# Patient Record
Sex: FEMALE | Race: WHITE | URBAN - METROPOLITAN AREA
[De-identification: names, ages, dates, MRNs, and addresses within clinical notes are randomized per-mention and may not be internally consistent; named-entity substitution may affect disease eponyms.]

---

## 2019-06-03 ENCOUNTER — APPOINTMENT (RX ONLY)
Dept: URBAN - METROPOLITAN AREA CLINIC 45 | Facility: CLINIC | Age: 59
Setting detail: DERMATOLOGY
End: 2019-06-03

## 2019-06-03 DIAGNOSIS — L81.4 OTHER MELANIN HYPERPIGMENTATION: ICD-10-CM

## 2019-06-03 DIAGNOSIS — D18.0 HEMANGIOMA: ICD-10-CM

## 2019-06-03 DIAGNOSIS — L82.1 OTHER SEBORRHEIC KERATOSIS: ICD-10-CM

## 2019-06-03 DIAGNOSIS — D22 MELANOCYTIC NEVI: ICD-10-CM

## 2019-06-03 PROBLEM — L23.7 ALLERGIC CONTACT DERMATITIS DUE TO PLANTS, EXCEPT FOOD: Status: ACTIVE | Noted: 2019-06-03

## 2019-06-03 PROBLEM — F41.9 ANXIETY DISORDER, UNSPECIFIED: Status: ACTIVE | Noted: 2019-06-03

## 2019-06-03 PROBLEM — M12.9 ARTHROPATHY, UNSPECIFIED: Status: ACTIVE | Noted: 2019-06-03

## 2019-06-03 PROBLEM — F32.9 MAJOR DEPRESSIVE DISORDER, SINGLE EPISODE, UNSPECIFIED: Status: ACTIVE | Noted: 2019-06-03

## 2019-06-03 PROBLEM — H91.90 UNSPECIFIED HEARING LOSS, UNSPECIFIED EAR: Status: ACTIVE | Noted: 2019-06-03

## 2019-06-03 PROBLEM — J30.1 ALLERGIC RHINITIS DUE TO POLLEN: Status: ACTIVE | Noted: 2019-06-03

## 2019-06-03 PROBLEM — K21.9 GASTRO-ESOPHAGEAL REFLUX DISEASE WITHOUT ESOPHAGITIS: Status: ACTIVE | Noted: 2019-06-03

## 2019-06-03 PROBLEM — L85.3 XEROSIS CUTIS: Status: ACTIVE | Noted: 2019-06-03

## 2019-06-03 PROBLEM — D56.9 THALASSEMIA, UNSPECIFIED: Status: ACTIVE | Noted: 2019-06-03

## 2019-06-03 PROBLEM — D18.01 HEMANGIOMA OF SKIN AND SUBCUTANEOUS TISSUE: Status: ACTIVE | Noted: 2019-06-03

## 2019-06-03 PROBLEM — L29.8 OTHER PRURITUS: Status: ACTIVE | Noted: 2019-06-03

## 2019-06-03 PROBLEM — D22.5 MELANOCYTIC NEVI OF TRUNK: Status: ACTIVE | Noted: 2019-06-03

## 2019-06-03 PROCEDURE — ? INVENTORY

## 2019-06-03 PROCEDURE — ? COUNSELING

## 2019-06-03 PROCEDURE — 99202 OFFICE O/P NEW SF 15 MIN: CPT

## 2019-06-03 PROCEDURE — ? SUNSCREEN RECOMMENDATIONS

## 2019-06-03 ASSESSMENT — LOCATION DETAILED DESCRIPTION DERM
LOCATION DETAILED: PERIUMBILICAL SKIN
LOCATION DETAILED: RIGHT LATERAL ABDOMEN
LOCATION DETAILED: LEFT LATERAL ABDOMEN
LOCATION DETAILED: EPIGASTRIC SKIN

## 2019-06-03 ASSESSMENT — LOCATION ZONE DERM: LOCATION ZONE: TRUNK

## 2019-06-03 ASSESSMENT — LOCATION SIMPLE DESCRIPTION DERM: LOCATION SIMPLE: ABDOMEN

## 2020-06-08 ENCOUNTER — APPOINTMENT (RX ONLY)
Dept: URBAN - METROPOLITAN AREA CLINIC 45 | Facility: CLINIC | Age: 60
Setting detail: DERMATOLOGY
End: 2020-06-08

## 2020-06-08 DIAGNOSIS — D18.0 HEMANGIOMA: ICD-10-CM

## 2020-06-08 DIAGNOSIS — D22 MELANOCYTIC NEVI: ICD-10-CM

## 2020-06-08 DIAGNOSIS — L81.4 OTHER MELANIN HYPERPIGMENTATION: ICD-10-CM

## 2020-06-08 DIAGNOSIS — L82.1 OTHER SEBORRHEIC KERATOSIS: ICD-10-CM

## 2020-06-08 PROBLEM — D22.5 MELANOCYTIC NEVI OF TRUNK: Status: ACTIVE | Noted: 2020-06-08

## 2020-06-08 PROBLEM — D18.01 HEMANGIOMA OF SKIN AND SUBCUTANEOUS TISSUE: Status: ACTIVE | Noted: 2020-06-08

## 2020-06-08 PROCEDURE — 99213 OFFICE O/P EST LOW 20 MIN: CPT

## 2020-06-08 PROCEDURE — ? SUNSCREEN RECOMMENDATIONS

## 2020-06-08 PROCEDURE — ? COUNSELING

## 2020-06-08 PROCEDURE — ? FULL BODY SKIN EXAM

## 2020-06-08 ASSESSMENT — LOCATION DETAILED DESCRIPTION DERM
LOCATION DETAILED: LEFT LATERAL ABDOMEN
LOCATION DETAILED: EPIGASTRIC SKIN
LOCATION DETAILED: PERIUMBILICAL SKIN
LOCATION DETAILED: RIGHT LATERAL ABDOMEN

## 2020-06-08 ASSESSMENT — LOCATION SIMPLE DESCRIPTION DERM: LOCATION SIMPLE: ABDOMEN

## 2020-06-08 ASSESSMENT — LOCATION ZONE DERM: LOCATION ZONE: TRUNK

## 2020-06-08 NOTE — PROCEDURE: MIPS QUALITY
Quality 431: Preventive Care And Screening: Unhealthy Alcohol Use - Screening: Patient screened for unhealthy alcohol use using a single question and scores less than 2 times per year
Quality 110: Preventive Care And Screening: Influenza Immunization: Influenza Immunization Administered during Influenza season
Quality 226: Preventive Care And Screening: Tobacco Use: Screening And Cessation Intervention: Patient screened for tobacco use, is a smoker AND received Cessation Counseling
Quality 111:Pneumonia Vaccination Status For Older Adults: Pneumococcal Vaccination Previously Received
Quality 130: Documentation Of Current Medications In The Medical Record: Current Medications Documented
Detail Level: Detailed

## 2021-06-07 ENCOUNTER — APPOINTMENT (RX ONLY)
Dept: URBAN - METROPOLITAN AREA CLINIC 45 | Facility: CLINIC | Age: 61
Setting detail: DERMATOLOGY
End: 2021-06-07

## 2021-06-07 DIAGNOSIS — L82.1 OTHER SEBORRHEIC KERATOSIS: ICD-10-CM | Status: STABLE

## 2021-06-07 DIAGNOSIS — L81.4 OTHER MELANIN HYPERPIGMENTATION: ICD-10-CM | Status: STABLE

## 2021-06-07 DIAGNOSIS — D18.0 HEMANGIOMA: ICD-10-CM | Status: STABLE

## 2021-06-07 DIAGNOSIS — D22 MELANOCYTIC NEVI: ICD-10-CM | Status: STABLE

## 2021-06-07 PROBLEM — D22.5 MELANOCYTIC NEVI OF TRUNK: Status: ACTIVE | Noted: 2021-06-07

## 2021-06-07 PROBLEM — D18.01 HEMANGIOMA OF SKIN AND SUBCUTANEOUS TISSUE: Status: ACTIVE | Noted: 2021-06-07

## 2021-06-07 PROCEDURE — ? COUNSELING

## 2021-06-07 PROCEDURE — 99213 OFFICE O/P EST LOW 20 MIN: CPT

## 2021-06-07 PROCEDURE — ? SUNSCREEN RECOMMENDATIONS

## 2021-06-07 PROCEDURE — ? ADDITIONAL NOTES

## 2021-06-07 ASSESSMENT — LOCATION DETAILED DESCRIPTION DERM
LOCATION DETAILED: LEFT LATERAL ABDOMEN
LOCATION DETAILED: EPIGASTRIC SKIN
LOCATION DETAILED: RIGHT LATERAL ABDOMEN
LOCATION DETAILED: PERIUMBILICAL SKIN

## 2021-06-07 ASSESSMENT — LOCATION SIMPLE DESCRIPTION DERM: LOCATION SIMPLE: ABDOMEN

## 2021-06-07 ASSESSMENT — LOCATION ZONE DERM: LOCATION ZONE: TRUNK

## 2021-10-08 ENCOUNTER — TELEPHONE ENCOUNTER (OUTPATIENT)
Dept: URBAN - METROPOLITAN AREA CLINIC 96 | Facility: CLINIC | Age: 61
End: 2021-10-08

## 2021-10-08 ENCOUNTER — OUT OF OFFICE VISIT (OUTPATIENT)
Dept: URBAN - METROPOLITAN AREA MEDICAL CENTER 24 | Facility: MEDICAL CENTER | Age: 61
End: 2021-10-08
Payer: MEDICARE

## 2021-10-08 DIAGNOSIS — K62.5 ANAL BLEEDING: ICD-10-CM

## 2021-10-08 PROCEDURE — G8427 DOCREV CUR MEDS BY ELIG CLIN: HCPCS | Performed by: INTERNAL MEDICINE

## 2021-10-08 PROCEDURE — 99222 1ST HOSP IP/OBS MODERATE 55: CPT | Performed by: INTERNAL MEDICINE

## 2021-10-09 ENCOUNTER — OUT OF OFFICE VISIT (OUTPATIENT)
Dept: URBAN - METROPOLITAN AREA MEDICAL CENTER 24 | Facility: MEDICAL CENTER | Age: 61
End: 2021-10-09
Payer: MEDICARE

## 2021-10-09 DIAGNOSIS — K92.1 ACUTE MELENA: ICD-10-CM

## 2021-10-09 DIAGNOSIS — K64.8 BLEEDING INTERNAL HEMORRHOIDS: ICD-10-CM

## 2021-10-09 PROCEDURE — 99232 SBSQ HOSP IP/OBS MODERATE 35: CPT | Performed by: INTERNAL MEDICINE

## 2021-10-14 ENCOUNTER — OFFICE VISIT (OUTPATIENT)
Dept: URBAN - METROPOLITAN AREA CLINIC 82 | Facility: CLINIC | Age: 61
End: 2021-10-14
Payer: MEDICARE

## 2021-10-14 ENCOUNTER — WEB ENCOUNTER (OUTPATIENT)
Dept: URBAN - METROPOLITAN AREA CLINIC 82 | Facility: CLINIC | Age: 61
End: 2021-10-14

## 2021-10-14 VITALS
SYSTOLIC BLOOD PRESSURE: 119 MMHG | WEIGHT: 140.6 LBS | TEMPERATURE: 97.1 F | HEART RATE: 89 BPM | HEIGHT: 61 IN | BODY MASS INDEX: 26.55 KG/M2 | DIASTOLIC BLOOD PRESSURE: 67 MMHG

## 2021-10-14 DIAGNOSIS — K57.90 DIVERTICULOSIS: ICD-10-CM

## 2021-10-14 DIAGNOSIS — K64.9 HEMORRHOID: ICD-10-CM

## 2021-10-14 DIAGNOSIS — K62.5 RECTAL BLEED: ICD-10-CM

## 2021-10-14 PROCEDURE — 99213 OFFICE O/P EST LOW 20 MIN: CPT | Performed by: INTERNAL MEDICINE

## 2021-10-14 NOTE — HPI-TODAY'S VISIT:
RECTAL BLEEDING, PAST MONDAY CAME HOME. STARTED ON OCT 11TH, STAYED 3 DAYS.   IT WAS HEMORRHOID, INTERNAL ACCORDING TO HOSPITAL.   NO STOMACH PAIN  NO BLOOD TRANSFUSION, BLOOD WORK WAS GOOD.   DISCHARGE SUMMARY REVIEWED.  H/O DIARRHEA 2 WEEKS BACK,   TRING TO INCREASE FIBER

## 2021-10-15 LAB
BASO (ABSOLUTE): 0
BASOS: 0
EOS (ABSOLUTE): 0.2
EOS: 2
HEMATOCRIT: 27.2
HEMATOLOGY COMMENTS:: (no result)
HEMOGLOBIN: 8.5
IMMATURE CELLS: (no result)
IMMATURE GRANS (ABS): 0
IMMATURE GRANULOCYTES: 0
LYMPHS (ABSOLUTE): 2.2
LYMPHS: 21
MCH: 30.1
MCHC: 31.3
MCV: 97
MONOCYTES(ABSOLUTE): 0.7
MONOCYTES: 7
NEUTROPHILS (ABSOLUTE): 7.4
NEUTROPHILS: 70
NRBC: (no result)
PLATELETS: 427
RBC: 2.82
RDW: 14.2
WBC: 10.5

## 2021-11-10 ENCOUNTER — LAB OUTSIDE AN ENCOUNTER (OUTPATIENT)
Dept: URBAN - METROPOLITAN AREA CLINIC 82 | Facility: CLINIC | Age: 61
End: 2021-11-10

## 2021-11-11 LAB
BASO (ABSOLUTE): 0.1
BASOS: 1
EOS (ABSOLUTE): 0.2
EOS: 2
HEMATOCRIT: 34
HEMATOLOGY COMMENTS:: (no result)
HEMOGLOBIN: 10.9
IMMATURE CELLS: (no result)
IMMATURE GRANS (ABS): 0
IMMATURE GRANULOCYTES: 0
LYMPHS (ABSOLUTE): 2.1
LYMPHS: 24
MCH: 30.4
MCHC: 32.1
MCV: 95
MONOCYTES(ABSOLUTE): 0.7
MONOCYTES: 8
NEUTROPHILS (ABSOLUTE): 5.5
NEUTROPHILS: 65
NRBC: (no result)
PLATELETS: 507
RBC: 3.58
RDW: 14.8
WBC: 8.5

## 2022-01-04 ENCOUNTER — OFFICE VISIT (OUTPATIENT)
Dept: URBAN - METROPOLITAN AREA CLINIC 82 | Facility: CLINIC | Age: 62
End: 2022-01-04
Payer: MEDICARE

## 2022-01-04 VITALS
WEIGHT: 137 LBS | DIASTOLIC BLOOD PRESSURE: 81 MMHG | HEART RATE: 74 BPM | HEIGHT: 61 IN | BODY MASS INDEX: 25.86 KG/M2 | SYSTOLIC BLOOD PRESSURE: 152 MMHG | RESPIRATION RATE: 14 BRPM | TEMPERATURE: 97.1 F

## 2022-01-04 DIAGNOSIS — K44.9 HIATAL HERNIA: ICD-10-CM

## 2022-01-04 DIAGNOSIS — K62.5 RECTAL BLEED: ICD-10-CM

## 2022-01-04 DIAGNOSIS — D64.9 ANEMIA, UNSPECIFIED TYPE: ICD-10-CM

## 2022-01-04 DIAGNOSIS — K64.9 HEMORRHOID: ICD-10-CM

## 2022-01-04 DIAGNOSIS — K57.90 DIVERTICULOSIS: ICD-10-CM

## 2022-01-04 PROCEDURE — 99213 OFFICE O/P EST LOW 20 MIN: CPT | Performed by: INTERNAL MEDICINE

## 2022-01-04 NOTE — HPI-TODAY'S VISIT:
TAKES IRON PILL DAILY. NO STOACH ISSUES.   METAMUCILL HELPS A LOTS  ANEMIA. NO FOLLOW UP BLOOD COUNT  NO BLEEDING P/R  NO CONSTIPATION,  ON CANE , FOR BACK PAIN.

## 2022-01-05 LAB
BASO (ABSOLUTE): 0
BASOS: 1
EOS (ABSOLUTE): 0.2
EOS: 2
HEMATOCRIT: 43.3
HEMATOLOGY COMMENTS:: (no result)
HEMOGLOBIN: 14
IMMATURE CELLS: (no result)
IMMATURE GRANS (ABS): 0
IMMATURE GRANULOCYTES: 0
LYMPHS (ABSOLUTE): 1.8
LYMPHS: 26
MCH: 29.5
MCHC: 32.3
MCV: 91
MONOCYTES(ABSOLUTE): 0.3
MONOCYTES: 5
NEUTROPHILS (ABSOLUTE): 4.5
NEUTROPHILS: 66
NRBC: (no result)
PLATELETS: 353
RBC: 4.74
RDW: 14.3
WBC: 6.8

## 2022-02-15 ENCOUNTER — LAB OUTSIDE AN ENCOUNTER (OUTPATIENT)
Dept: URBAN - METROPOLITAN AREA CLINIC 82 | Facility: CLINIC | Age: 62
End: 2022-02-15

## 2022-02-16 LAB
BASO (ABSOLUTE): 0
BASOS: 1
EOS (ABSOLUTE): 0.1
EOS: 2
HEMATOCRIT: 43.5
HEMATOLOGY COMMENTS:: (no result)
HEMOGLOBIN: 13.6
IMMATURE CELLS: (no result)
IMMATURE GRANS (ABS): 0
IMMATURE GRANULOCYTES: 0
LYMPHS (ABSOLUTE): 2
LYMPHS: 26
MCH: 28
MCHC: 31.3
MCV: 90
MONOCYTES(ABSOLUTE): 0.6
MONOCYTES: 8
NEUTROPHILS (ABSOLUTE): 4.9
NEUTROPHILS: 63
NRBC: (no result)
PLATELETS: 341
RBC: 4.86
RDW: 15.3
WBC: 7.7

## 2022-02-22 ENCOUNTER — OFFICE VISIT (OUTPATIENT)
Dept: URBAN - METROPOLITAN AREA CLINIC 82 | Facility: CLINIC | Age: 62
End: 2022-02-22
Payer: MEDICARE

## 2022-02-22 DIAGNOSIS — K57.90 DIVERTICULOSIS: ICD-10-CM

## 2022-02-22 DIAGNOSIS — K64.9 HEMORRHOID: ICD-10-CM

## 2022-02-22 DIAGNOSIS — K44.9 HIATAL HERNIA: ICD-10-CM

## 2022-02-22 DIAGNOSIS — K21.9 GERD: ICD-10-CM

## 2022-02-22 DIAGNOSIS — K62.5 RECTAL BLEED: ICD-10-CM

## 2022-02-22 DIAGNOSIS — D64.9 ANEMIA, UNSPECIFIED TYPE: ICD-10-CM

## 2022-02-22 PROCEDURE — 99214 OFFICE O/P EST MOD 30 MIN: CPT | Performed by: INTERNAL MEDICINE

## 2022-02-22 RX ORDER — OMEPRAZOLE 20 MG/1
1 CAPSULE 30 MINUTES BEFORE MORNING MEAL CAPSULE, DELAYED RELEASE ORAL ONCE A DAY
Qty: 90 | Refills: 3 | OUTPATIENT

## 2022-02-22 NOTE — HPI-TODAY'S VISIT:
heart burn and indigestion, acid comes back in throat. iron once a day. no bleeding p/r  no dysphagia, no n/v. eat good, no wt loss.  no nsaids.

## 2022-05-19 LAB
BASO (ABSOLUTE): 0
BASOS: 0
EOS (ABSOLUTE): 0.2
EOS: 2
HEMATOCRIT: 44.8
HEMATOLOGY COMMENTS:: (no result)
HEMOGLOBIN: 14.5
IMMATURE CELLS: (no result)
IMMATURE GRANS (ABS): 0
IMMATURE GRANULOCYTES: 0
LYMPHS (ABSOLUTE): 1.7
LYMPHS: 19
MCH: 30.1
MCHC: 32.4
MCV: 93
MONOCYTES(ABSOLUTE): 0.7
MONOCYTES: 8
NEUTROPHILS (ABSOLUTE): 6.6
NEUTROPHILS: 71
NRBC: (no result)
PLATELETS: 291
RBC: 4.81
RDW: 14.4
WBC: 9.3

## 2022-05-22 ENCOUNTER — LAB OUTSIDE AN ENCOUNTER (OUTPATIENT)
Dept: URBAN - METROPOLITAN AREA CLINIC 82 | Facility: CLINIC | Age: 62
End: 2022-05-22

## 2022-05-24 ENCOUNTER — OFFICE VISIT (OUTPATIENT)
Dept: URBAN - METROPOLITAN AREA CLINIC 82 | Facility: CLINIC | Age: 62
End: 2022-05-24

## 2022-05-25 ENCOUNTER — DASHBOARD ENCOUNTERS (OUTPATIENT)
Age: 62
End: 2022-05-25

## 2022-05-25 ENCOUNTER — OFFICE VISIT (OUTPATIENT)
Dept: URBAN - METROPOLITAN AREA CLINIC 82 | Facility: CLINIC | Age: 62
End: 2022-05-25
Payer: MEDICARE

## 2022-05-25 VITALS
WEIGHT: 135.2 LBS | DIASTOLIC BLOOD PRESSURE: 83 MMHG | TEMPERATURE: 98.1 F | BODY MASS INDEX: 25.53 KG/M2 | HEART RATE: 77 BPM | HEIGHT: 61 IN | SYSTOLIC BLOOD PRESSURE: 160 MMHG

## 2022-05-25 DIAGNOSIS — D64.9 ANEMIA, UNSPECIFIED TYPE: ICD-10-CM

## 2022-05-25 DIAGNOSIS — K64.9 HEMORRHOID: ICD-10-CM

## 2022-05-25 DIAGNOSIS — K62.5 RECTAL BLEED: ICD-10-CM

## 2022-05-25 DIAGNOSIS — K21.9 GERD: ICD-10-CM

## 2022-05-25 DIAGNOSIS — K44.9 HIATAL HERNIA: ICD-10-CM

## 2022-05-25 DIAGNOSIS — K57.90 DIVERTICULOSIS: ICD-10-CM

## 2022-05-25 PROBLEM — 271737000: Status: ACTIVE | Noted: 2022-01-04

## 2022-05-25 PROBLEM — 12063002: Status: ACTIVE | Noted: 2021-10-14

## 2022-05-25 PROBLEM — 70153002 HEMORRHOID: Status: ACTIVE | Noted: 2021-10-14

## 2022-05-25 PROCEDURE — 99213 OFFICE O/P EST LOW 20 MIN: CPT | Performed by: INTERNAL MEDICINE

## 2022-05-25 RX ORDER — OMEPRAZOLE 20 MG/1
1 CAPSULE 30 MINUTES BEFORE MORNING MEAL CAPSULE, DELAYED RELEASE ORAL ONCE A DAY
Qty: 90 | Refills: 3 | Status: ACTIVE | COMMUNITY

## 2022-05-25 RX ORDER — OMEPRAZOLE 20 MG/1
1 CAPSULE 30 MINUTES BEFORE MORNING MEAL CAPSULE, DELAYED RELEASE ORAL ONCE A DAY
Qty: 90 | Refills: 3 | OUTPATIENT

## 2022-05-25 NOTE — HPI-TODAY'S VISIT:
no bleeding, no reflux, take omeprazole once a day,  take iron pill a day  no bleeding or black stool  no constipation

## 2022-06-06 ENCOUNTER — APPOINTMENT (RX ONLY)
Dept: URBAN - METROPOLITAN AREA CLINIC 45 | Facility: CLINIC | Age: 62
Setting detail: DERMATOLOGY
End: 2022-06-06

## 2022-06-06 DIAGNOSIS — L81.4 OTHER MELANIN HYPERPIGMENTATION: ICD-10-CM | Status: STABLE

## 2022-06-06 DIAGNOSIS — L57.0 ACTINIC KERATOSIS: ICD-10-CM

## 2022-06-06 DIAGNOSIS — D22 MELANOCYTIC NEVI: ICD-10-CM | Status: STABLE

## 2022-06-06 DIAGNOSIS — D18.0 HEMANGIOMA: ICD-10-CM | Status: STABLE

## 2022-06-06 DIAGNOSIS — L82.1 OTHER SEBORRHEIC KERATOSIS: ICD-10-CM | Status: STABLE

## 2022-06-06 PROBLEM — D22.5 MELANOCYTIC NEVI OF TRUNK: Status: ACTIVE | Noted: 2022-06-06

## 2022-06-06 PROBLEM — D18.01 HEMANGIOMA OF SKIN AND SUBCUTANEOUS TISSUE: Status: ACTIVE | Noted: 2022-06-06

## 2022-06-06 PROCEDURE — ? COUNSELING

## 2022-06-06 PROCEDURE — 17000 DESTRUCT PREMALG LESION: CPT

## 2022-06-06 PROCEDURE — ? SUNSCREEN RECOMMENDATIONS

## 2022-06-06 PROCEDURE — ? LIQUID NITROGEN

## 2022-06-06 PROCEDURE — ? ADDITIONAL NOTES

## 2022-06-06 PROCEDURE — ? MEDICARE ABN

## 2022-06-06 PROCEDURE — 99213 OFFICE O/P EST LOW 20 MIN: CPT | Mod: 25

## 2022-06-06 PROCEDURE — ? FULL BODY SKIN EXAM

## 2022-06-06 ASSESSMENT — LOCATION DETAILED DESCRIPTION DERM
LOCATION DETAILED: LEFT MEDIAL TEMPLE
LOCATION DETAILED: PERIUMBILICAL SKIN
LOCATION DETAILED: EPIGASTRIC SKIN
LOCATION DETAILED: LEFT MEDIAL TEMPLE
LOCATION DETAILED: RIGHT LATERAL ABDOMEN
LOCATION DETAILED: LEFT LATERAL ABDOMEN

## 2022-06-06 ASSESSMENT — LOCATION SIMPLE DESCRIPTION DERM
LOCATION SIMPLE: ABDOMEN
LOCATION SIMPLE: LEFT TEMPLE
LOCATION SIMPLE: LEFT TEMPLE

## 2022-06-06 ASSESSMENT — LOCATION ZONE DERM
LOCATION ZONE: FACE
LOCATION ZONE: TRUNK
LOCATION ZONE: FACE

## 2022-06-06 NOTE — HPI: EVALUATION OF SKIN LESION(S)
What Type Of Note Output Would You Prefer (Optional)?: Bullet Format
Hpi Title: Evaluation of Skin Lesions
Additional History: Pt concern of spots on back. Last seen 06/2021 ETM

## 2022-08-25 ENCOUNTER — LAB OUTSIDE AN ENCOUNTER (OUTPATIENT)
Dept: URBAN - METROPOLITAN AREA CLINIC 82 | Facility: CLINIC | Age: 62
End: 2022-08-25

## 2022-11-25 ENCOUNTER — LAB OUTSIDE AN ENCOUNTER (OUTPATIENT)
Dept: URBAN - METROPOLITAN AREA CLINIC 82 | Facility: CLINIC | Age: 62
End: 2022-11-25

## 2022-12-02 ENCOUNTER — OFFICE VISIT (OUTPATIENT)
Dept: URBAN - METROPOLITAN AREA CLINIC 82 | Facility: CLINIC | Age: 62
End: 2022-12-02

## 2024-02-12 NOTE — PROCEDURE: LIQUID NITROGEN
An angiography was performed of the proximal left common iliac arteries via hand injection with
Detail Level: Simple
Show Applicator Variable?: Yes
Consent: The patient's consent was obtained including but not limited to risks of crusting, scabbing, blistering, scarring, darker or lighter pigmentary change, recurrence, incomplete removal and infection.
Render Post-Care Instructions In Note?: no
Number Of Freeze-Thaw Cycles: 1 freeze-thaw cycle
Post-Care Instructions: I reviewed with the patient in detail post-care instructions. Patient is to wear sunprotection, and avoid picking at any of the treated lesions. Pt may apply Vaseline to crusted or scabbing areas.  Follow up 1 mo if not resolved.
Duration Of Freeze Thaw-Cycle (Seconds): 10

## 2024-06-19 RX ORDER — ALLOPURINOL 100 MG/1
100 TABLET ORAL DAILY
Qty: 90 TABLET | Refills: 0 | Status: SHIPPED | OUTPATIENT
Start: 2024-06-19

## 2024-07-15 RX ORDER — HYDROXYCHLOROQUINE SULFATE 200 MG/1
200 TABLET, FILM COATED ORAL 2 TIMES DAILY
Qty: 60 TABLET | Refills: 0 | Status: SHIPPED | OUTPATIENT
Start: 2024-07-15

## 2024-08-05 ENCOUNTER — TELEPHONE (OUTPATIENT)
Age: 64
End: 2024-08-05
Payer: MEDICARE

## 2024-08-05 NOTE — TELEPHONE ENCOUNTER
Kaylyn from Dr. Dixon office has called to inquire on medication for Hydroxychloroquine (Plaquenil) 200 MG tablet. Patient is having a procedure soon and the office needs to know what to do about holding that medicine.     Can you please contact and give a call at [phone].

## 2024-08-20 PROBLEM — M25.50 JOINT PAIN: Status: ACTIVE | Noted: 2024-08-20

## 2024-08-20 PROBLEM — M06.9 RHEUMATOID ARTHRITIS: Status: ACTIVE | Noted: 2024-08-20

## 2024-08-23 ENCOUNTER — OFFICE VISIT (OUTPATIENT)
Age: 64
End: 2024-08-23
Payer: MEDICARE

## 2024-08-23 ENCOUNTER — TELEPHONE (OUTPATIENT)
Age: 64
End: 2024-08-23

## 2024-08-23 VITALS
HEART RATE: 64 BPM | WEIGHT: 136 LBS | SYSTOLIC BLOOD PRESSURE: 180 MMHG | HEIGHT: 60 IN | DIASTOLIC BLOOD PRESSURE: 90 MMHG | BODY MASS INDEX: 26.7 KG/M2 | TEMPERATURE: 97.7 F

## 2024-08-23 DIAGNOSIS — M05.79 RHEUMATOID ARTHRITIS INVOLVING MULTIPLE SITES WITH POSITIVE RHEUMATOID FACTOR: ICD-10-CM

## 2024-08-23 DIAGNOSIS — Z79.899 HIGH RISK MEDICATION USE: ICD-10-CM

## 2024-08-23 DIAGNOSIS — R52 PAIN MANAGEMENT: ICD-10-CM

## 2024-08-23 DIAGNOSIS — M1A.9XX0 CHRONIC GOUT WITHOUT TOPHUS, UNSPECIFIED CAUSE, UNSPECIFIED SITE: ICD-10-CM

## 2024-08-23 DIAGNOSIS — Z72.0 TOBACCO USE: Primary | ICD-10-CM

## 2024-08-23 DIAGNOSIS — R76.8 POSITIVE ANA (ANTINUCLEAR ANTIBODY): ICD-10-CM

## 2024-08-23 DIAGNOSIS — M54.50 CHRONIC MIDLINE LOW BACK PAIN WITHOUT SCIATICA: ICD-10-CM

## 2024-08-23 DIAGNOSIS — G89.29 CHRONIC MIDLINE LOW BACK PAIN WITHOUT SCIATICA: ICD-10-CM

## 2024-08-23 RX ORDER — ALBUTEROL SULFATE 90 UG/1
1 INHALANT RESPIRATORY (INHALATION) EVERY 6 HOURS PRN
COMMUNITY
Start: 2024-03-25 | End: 2025-03-25

## 2024-08-23 RX ORDER — CETIRIZINE HYDROCHLORIDE 10 MG/1
10 TABLET ORAL DAILY
COMMUNITY
Start: 2024-06-20

## 2024-08-23 RX ORDER — ACETAMINOPHEN 500 MG
500 TABLET ORAL
COMMUNITY

## 2024-08-23 RX ORDER — HYDROXYCHLOROQUINE SULFATE 200 MG/1
200 TABLET, FILM COATED ORAL 2 TIMES DAILY
Qty: 60 TABLET | Refills: 5 | Status: SHIPPED | OUTPATIENT
Start: 2024-08-23

## 2024-08-23 RX ORDER — FLUTICASONE PROPIONATE 50 MCG
1 SPRAY, SUSPENSION (ML) NASAL DAILY
COMMUNITY
Start: 2024-06-20 | End: 2025-06-20

## 2024-08-23 RX ORDER — OMEGA-3S/DHA/EPA/FISH OIL/D3 300MG-1000
1000 CAPSULE ORAL DAILY
COMMUNITY

## 2024-08-23 RX ORDER — PROCHLORPERAZINE MALEATE 5 MG/1
TABLET ORAL
COMMUNITY
Start: 2024-05-18

## 2024-08-23 RX ORDER — CYCLOBENZAPRINE HCL 10 MG
TABLET ORAL
COMMUNITY

## 2024-08-23 RX ORDER — BUSPIRONE HYDROCHLORIDE 15 MG/1
15 TABLET ORAL 2 TIMES DAILY
COMMUNITY
Start: 2024-08-06

## 2024-08-23 NOTE — PROGRESS NOTES
[facility] Follow Up Visit     Office Follow Up      Date: 08/23/2024   Patient Name: Meryl Patel  MRN: [id number]  YOB: 1960    Referring Physician: Alber Rausch MD     Chief Complaint   Patient presents with    Follow-up       History of Present Illness: Meryl Patel is a 63 y.o. female who is here today for follow up on   History of Present Illness  The patient presents for evaluation of multiple medical concerns.    She recently had a temporary spinal stimulator implanted, which was initially not approved by her insurance but has since been approved. This device significantly improved her mobility, enabling her to walk more upright. However, she continues to experience morning stiffness lasting approximately 2 hours. Her back pain remains severe, rating it at 9 out of 10. She is scheduled to have a permanent spinal stimulator implanted by Dr. Dixon.    She also reports pain and swelling in her MCP joints. She is currently taking Plaquenil twice daily, which has led to noticeable improvement. She underwent an OCT test in June 2024 due to abnormal results from a previous test. She reports no recent gout attacks.    She plans to use nicotine patches to aid in smoking cessation once the spinal stimulator is implanted. She believes that smoking alleviates her pain. She has been walking 1 to 2 miles every other day.    She has lost all her teeth and is in the process of getting dentures. She attributes her tooth loss to long-term antibiotic use.    Additionally, she broke her left 5th toe.    SOCIAL HISTORY  She smokes about 5 to 7 cigarettes a day.       Result Review :        Results  Laboratory Studies  CBC was normal. CMP showed sodium was 147.    Testing  Visual field test for left eye was unreliable due to false positives.          Subjective     Allergies   Allergen Reactions    Morphine Provider Review Needed    Penicillins Provider Review  Needed    Sulfa Antibiotics Provider Review Needed         Current Outpatient Medications:     acetaminophen (TYLENOL) 500 MG tablet, Take 1 tablet by mouth., Disp: , Rfl:     albuterol sulfate  (90 Base) MCG/ACT inhaler, Inhale 1 puff Every 6 (Six) Hours As Needed., Disp: , Rfl:     allopurinol (ZYLOPRIM) 100 MG tablet, TAKE 1 TABLET BY MOUTH DAILY, Disp: 90 tablet, Rfl: 0    busPIRone (BUSPAR) 15 MG tablet, Take 1 tablet by mouth 2 (Two) Times a Day., Disp: , Rfl:     cetirizine (zyrTEC) 10 MG tablet, Take 1 tablet by mouth Daily., Disp: , Rfl:     Cholecalciferol 10 MCG (400 UNIT) tablet, Take 2.5 tablets by mouth Daily., Disp: , Rfl:     cyclobenzaprine (FLEXERIL) 10 MG tablet, Take 1 tablet as needed by oral route., Disp: , Rfl:     fluticasone (FLONASE) 50 MCG/ACT nasal spray, 1 spray into the nostril(s) as directed by provider Daily., Disp: , Rfl:     gabapentin (NEURONTIN) 100 MG capsule, Take 1 capsule by mouth 3 (Three) Times a Day., Disp: , Rfl:     hydroxychloroquine (PLAQUENIL) 200 MG tablet, TAKE 1 TABLET BY MOUTH TWICE A DAY, Disp: 60 tablet, Rfl: 0    pantoprazole (PROTONIX) 40 MG EC tablet, Take 1 tablet by mouth Daily., Disp: , Rfl:     prochlorperazine (COMPAZINE) 5 MG tablet, , Disp: , Rfl:     QUEtiapine (SEROquel) 100 MG tablet, Take 1 tablet by mouth Every Night., Disp: , Rfl:     rosuvastatin (CRESTOR) 5 MG tablet, Take 1 tablet by mouth Daily., Disp: , Rfl:     venlafaxine (EFFEXOR) 37.5 MG tablet, Take 1 tablet by mouth 2 (Two) Times a Day., Disp: , Rfl:     ferrous sulfate 325 (65 FE) MG tablet, Take 1 tablet by mouth Daily With Breakfast. (Patient not taking: Reported on 8/23/2024), Disp: , Rfl:     Past Medical History:   Diagnosis Date    Allergic rhinitis     Anemia     Anxiety     Arthritis     Broken bones     Cardiac murmur     Cataract     Chronic back pain     Depression     Elevated lipids     Headache     Hemorrhoids     Insomnia     Iron deficiency anemia     Kidney  stones     Nephrolithiasis     Osteopenia     Recurrent UTI     Stomach ulcer     Thyroid disease     Wound infection         No past surgical history on file.    Family History   Problem Relation Age of Onset    Arthritis Mother     Heart disease Mother     Prostate cancer Father     Diabetes Father     Kidney cancer Brother     Diabetes Brother     Peripheral vascular disease Brother         Social History     Socioeconomic History    Marital status: Single   Tobacco Use    Smoking status: Every Day     Types: Cigarettes     Passive exposure: Current    Smokeless tobacco: Never   Vaping Use    Vaping status: Never Used   Substance and Sexual Activity    Alcohol use: Never    Drug use: Never    Sexual activity: Defer       Review of Systems   Constitutional:  Positive for fatigue and unexpected weight gain. Negative for fever.   HENT:  Positive for tinnitus. Negative for mouth sores, nosebleeds, swollen glands and trouble swallowing.         Dry mouth  Dry eyes  Hearing loss     Eyes:  Positive for blurred vision. Negative for double vision, photophobia, pain and visual disturbance.   Respiratory:  Negative for apnea, cough, choking and shortness of breath.    Cardiovascular:  Negative for chest pain, palpitations and leg swelling.   Gastrointestinal:  Positive for GERD. Negative for abdominal pain, blood in stool, constipation, diarrhea, nausea and vomiting.   Endocrine: Positive for cold intolerance and polydipsia. Negative for heat intolerance, polyphagia and polyuria.   Genitourinary:  Negative for difficulty urinating, dysuria, genital sores, hematuria and urinary incontinence.        Kidney stones     Musculoskeletal:  Positive for back pain, joint swelling and neck stiffness. Negative for arthralgias, gait problem, myalgias, neck pain and bursitis.   Skin:  Negative for rash.   Allergic/Immunologic: Positive for environmental allergies. Negative for food allergies.   Neurological:  Positive for dizziness,  weakness, numbness and headache. Negative for tremors, seizures, syncope, memory problem and confusion.   Hematological:  Negative for adenopathy. Does not bruise/bleed easily.   Psychiatric/Behavioral:  Positive for depressed mood. Negative for sleep disturbance, suicidal ideas and stress. The patient is nervous/anxious.         I have reviewed and updated the patient's chief complaint, history of present illness, review of systems, past medical history, surgical history, family history, social history, medications and allergy list as appropriate.     Objective    Vital Signs:   There were no vitals filed for this visit.  Meryl Patel reports a pain score of .  Given her pain assessment as noted, treatment options were discussed and the following options were decided upon as a follow-up plan to address the patient's pain: .      There is no height or weight on file to calculate BMI.  BMI cannot be calculated due to outdated height or weight values.  Please input a current height/weight in Vitals and re-renter BMIFOLLOWUP in Note to pull in correct documentation based on BMI range.      Physical Exam   Physical Exam  Patient is alert and in no acute distress. A cane is used for mobility.  Head is atraumatic and normocephalic. Pupils are round and equal. Extraocular muscles are intact. Oropharynx is negative. Patient is currently edentulous and in the process of getting dentures.  Lungs are clear.  Heart exhibits regular rhythm without rubs, gallops, or murmurs.  Cervical spine shows moderate decrease in range of motion. Lumbar spine is tender. Shoulders are nontender with good range of motion. Right hand exhibits some first CMC squaring and thumb subluxation, same on the left hand. Wrists and PIPs are nontender. No synovitis is present. Knees show slight crepitus. Left 5th toe is broken and tender, but otherwise there is no pain or swelling in the knees, ankles, and MTPs.  Skin is free of any acute rash.    Physical  Exam  There is currently no information documented on the homunculus. Go to the Rheumatology activity and [facility].     Results Review:   Imaging Results (Last 24 Hours)       ** No results found for the last 24 hours. **            Procedures    Assessment / Plan    Assessment/Plan:   There are no diagnoses linked to this encounter.      Assessment & Plan  1. Rheumatoid Arthritis.  Diagnosed with JRA in her teens. Positive FELIPE of 1:160 with positive rheumatoid factor IgA and IgM. IgG was negative. CCP was negative. Unable to take anti-inflammatories due to stomach ulcers. Last hand and foot films were negative for erosions but showed lateral deviation of the right second through fourth toes consistent with RA. Plaquenil was started in April 2023 and increased back to twice a day due to increased stiffness when reduced to once daily. Continue Plaquenil twice a day. [facility] will be contacted to ensure annual OCTs are performed. If OCT results are fine, Plaquenil will be continued; otherwise, a different medication will be considered. Continue Osteo Bi-Flex DS for osteoarthritis and Tylenol Arthritis 2 tablets twice a day as needed, [employer] generic or Tylenol brand.    2. Gout.  History of uric acid kidney stones and previous flares in the great toe joints. Allopurinol was started in December 2022, and she has had no attacks since. Continue allopurinol 100 mg/day.    3. Positive FELIPE 1:160 with negative secondary serologies.  No signs or symptoms of lupus. She will use sunscreen with an SPF of 50+.    4. Back Pain.  History of degenerative disc disease with multiple lumbar surgeries, the last in 2017. Previous injections and current use of gabapentin. Recently had a temporary trial of a spinal stimulator with excellent results. Plan to have a permanent spinal stimulator placed in the near future.    5. High-Risk Medication: Plaquenil.  Started in April 2023, well tolerated and  effective. CBC and CMP every 6 months. [facility]. Annual eye exam with OCT, last done in June 2024. [facility] will be contacted to confirm if the OCT was performed this year.    6. Smoking Cessation.  Currently smokes 5-7 cigarettes a day. Plans to discontinue smoking when the permanent spinal stimulator is placed. Discussed smoking cessation and the use of patches during recovery.          Follow Up:   No follow-ups on file.       Julia Doyle MD  [facility]     Encounter Administratively Closed by Health Information Management

## 2024-08-23 NOTE — TELEPHONE ENCOUNTER
Please call [facility]bir [facility].  Her visual field was indeterminate for her left eye and they are going to do the OCT annually which is actually what I want to determine any type of Plaquenil toxicity.  Please call them and see if she has had an OCT for 2024 and what the results were in terms of continuation of Plaquenil.

## 2024-09-13 PROBLEM — R76.8 POSITIVE ANA (ANTINUCLEAR ANTIBODY): Status: ACTIVE | Noted: 2024-09-13

## 2024-09-13 PROBLEM — R52 PAIN MANAGEMENT: Status: ACTIVE | Noted: 2024-09-13

## 2024-09-13 PROBLEM — M1A.9XX0 CHRONIC GOUT WITHOUT TOPHUS: Status: ACTIVE | Noted: 2024-09-13

## 2024-09-13 PROBLEM — G89.29 CHRONIC MIDLINE LOW BACK PAIN WITHOUT SCIATICA: Status: ACTIVE | Noted: 2024-09-13

## 2024-09-13 PROBLEM — M54.50 CHRONIC MIDLINE LOW BACK PAIN WITHOUT SCIATICA: Status: ACTIVE | Noted: 2024-09-13

## 2024-09-13 PROBLEM — Z79.899 HIGH RISK MEDICATION USE: Status: ACTIVE | Noted: 2024-09-13

## 2024-09-13 NOTE — ASSESSMENT & PLAN NOTE
Diagnosed with JRA in her teens, has never been on DMARDs in past  Positive pain in the wrists, mcp and pip joints.  Positive sage 1:160, RF IGA 8 +, IGM 27, IGG negative 11/22 ; Ccp negative.  No nsaids secondary to ulcers  11/2022 hand films with OA changes, foot films with degenerative changes bilateral 1st MTPs, bunion formation bilaterally, and lateral deviation right 2-4 toes consistent with RA  Plaquenil trial started 4/2023 and seems to be helping her joints with decreased pain, swelling. When we decreased her to once daily she has experienced more stiffness in PIPs. XR negative for erosions.       Plan:  I will increase Plaquenil back to bid until her next visit. If she has dramatic improvement, I will lower her Plaquenil and add another medication.   Osteo bi flex DS. Glucosamine chondroitin for OA with some relief.   Tylenol Arthritis 2 twice daily - [employer] generic or BRAND.

## 2024-09-13 NOTE — ASSESSMENT & PLAN NOTE
Plaquenil trial started 4/2023  S/p Covid vaccine + booster + bivalent. S/p Fall 23 booster.     Plan:    CBC, CMP every 4-6 months-  2/24 sodium 147, glucose 54  Eye exam/ Annual OCT  4/23

## 2024-09-13 NOTE — ASSESSMENT & PLAN NOTE
She has a history DDD with multiple lumbar surgeries with Dr. Garcia, most recently in 2017  She has also received injections in the past  She cannot take NSAIDs  She is currently on gabapentin.  Still has significant pain.  X-ray of the lumbosacral spine- Previous surgery stabilizing L4 to S1 and both sacroiliac joints.  There is narrowing and anterior sclerosis of T12 L1.  Disc space narrowing, spondylolisthesis and facet arthritis at L2/3.  S/p epidural    Plan:  Continue Tylenol and gabapentin  She cannot take NSAIDs  Continue water aerobics.  As per pain treatment. She is going to see neurologist at [facility] and discuss whether she needs surgery vs stimulator

## 2024-09-13 NOTE — ASSESSMENT & PLAN NOTE
Positive H/o uric acid kidney stones.  Has had three flares this year in the great toe joints with redness , heat and swelling that lasted one week  Uric acid 4.4 in 11/22  Allopurinol started 12/2022  Uric acid 3.5 in 6/23  No attacks.    Plan:    Continue Allopurinol 100mg per day. Will increase dose if she starts flaring.

## 2024-09-16 RX ORDER — ALLOPURINOL 100 MG/1
100 TABLET ORAL DAILY
Qty: 90 TABLET | Refills: 0 | Status: SHIPPED | OUTPATIENT
Start: 2024-09-16

## 2024-12-18 RX ORDER — ALLOPURINOL 100 MG/1
100 TABLET ORAL DAILY
Qty: 90 TABLET | Refills: 0 | Status: SHIPPED | OUTPATIENT
Start: 2024-12-18

## 2025-06-27 PROBLEM — Z51.81 ENCOUNTER FOR MEDICATION MONITORING: Status: ACTIVE | Noted: 2025-06-27

## 2025-06-27 NOTE — ASSESSMENT & PLAN NOTE
History of degenerative disc disease with multiple lumbar surgeries, the last in 2017.   Previous injections.   She had spinal stimulator with Dr. Dixon with excellent results.     Continue gabapentin and Flexeril per outside provider  Continue Tylenol Arthritis

## 2025-06-27 NOTE — ASSESSMENT & PLAN NOTE
Diagnosed with JRA in her teens.   Positive FELIPE of 1:160 with positive rheumatoid factor IgA and IgM. IgG was negative. CCP was negative.   Unable to take anti-inflammatories due to stomach ulcers.   Last hand and foot films were negative for erosions but showed lateral deviation of the right second through fourth toes consistent with RA.   Plaquenil was started in April 2023 and increased back to twice a day due to increased stiffness when reduced to once daily.     Continue Plaquenil at only once daily due to current weight  Continue Osteo Bi-Flex DS for osteoarthritis and Tylenol Arthritis 2 tablets twice a day as needed  Check labs today  RTC 6 months

## 2025-06-27 NOTE — PROGRESS NOTES
[facility] Office Follow Up Visit     Office Follow Up      Date: 07/16/2025   Patient Name: Meryl Patel  MRN: [id number]  YOB: 1960    Referring Physician: No ref. provider found     Chief Complaint   Patient presents with    Rheumatoid Arthritis    Gout       History of Present Illness: Meryl Patel is a 64 y.o. female who is here today for follow up of RA and gout.     She last saw Dr. Doyle 8/23/24.    Today she reports feeling the same. She rates her pain 6.5/10. She has 2 hours of morning stiffness.  She continues on Plaquenil and allopurinol. Medications are helpful and well tolerated.  No severe gout flares requiring medication.   Since we last saw her she had a spinal stimulator implanted with Dr. Dixon. This has been extremely helpful.    Subjective     Allergies   Allergen Reactions    Morphine Provider Review Needed    Penicillins Provider Review Needed    Sulfa Antibiotics Provider Review Needed         Current Outpatient Medications:     acetaminophen (TYLENOL) 500 MG tablet, Take 1 tablet by mouth., Disp: , Rfl:     albuterol sulfate  (90 Base) MCG/ACT inhaler, Inhale 2 puffs Every 6 (Six) Hours As Needed., Disp: , Rfl:     allopurinol (ZYLOPRIM) 100 MG tablet, TAKE 1 TABLET BY MOUTH DAILY, Disp: 90 tablet, Rfl: 0    busPIRone (BUSPAR) 15 MG tablet, Take 1 tablet by mouth 2 (Two) Times a Day., Disp: , Rfl:     cetirizine (zyrTEC) 10 MG tablet, Take 1 tablet by mouth Daily., Disp: , Rfl:     Cholecalciferol 10 MCG (400 UNIT) tablet, Take 2.5 tablets by mouth Daily., Disp: , Rfl:     cyclobenzaprine (FLEXERIL) 10 MG tablet, Take 1 tablet as needed by oral route., Disp: , Rfl:     gabapentin (NEURONTIN) 100 MG capsule, Take 1 capsule by mouth 3 (Three) Times a Day., Disp: , Rfl:     hydroxychloroquine (PLAQUENIL) 200 MG tablet, Take 1 tablet by mouth 2 (Two) Times a Day., Disp: 60 tablet, Rfl: 5    methocarbamol (ROBAXIN) 500 MG tablet,  Take 1 tablet by mouth As Needed., Disp: , Rfl:     pantoprazole (PROTONIX) 40 MG EC tablet, Take 1 tablet by mouth Daily., Disp: , Rfl:     prochlorperazine (COMPAZINE) 5 MG tablet, , Disp: , Rfl:     QUEtiapine (SEROquel) 100 MG tablet, Take 1 tablet by mouth Every Night., Disp: , Rfl:     rosuvastatin (CRESTOR) 5 MG tablet, Take 1 tablet by mouth Daily., Disp: , Rfl:     venlafaxine (EFFEXOR) 37.5 MG tablet, Take 1 tablet by mouth 2 (Two) Times a Day., Disp: , Rfl:     fluticasone (FLONASE) 50 MCG/ACT nasal spray, 1 spray into the nostril(s) as directed by provider Daily., Disp: , Rfl:     Past Medical History:   Diagnosis Date    Allergic rhinitis     Anemia     Anxiety     Arthritis     Broken bones     Cardiac murmur     Cataract     Chronic back pain     Depression     Elevated lipids     Headache     Hemorrhoids     Insomnia     Iron deficiency anemia     Kidney stones     Nephrolithiasis     Osteopenia     Recurrent UTI     Spinal cord stimulator status     Stomach ulcer     Thyroid disease     Wound infection         History reviewed. No pertinent surgical history.    Family History   Problem Relation Age of Onset    Arthritis Mother     Heart disease Mother     Prostate cancer Father     Diabetes Father     Kidney cancer Brother     Diabetes Brother     Peripheral vascular disease Brother         Social History     Socioeconomic History    Marital status: Single   Tobacco Use    Smoking status: Every Day     Types: Cigarettes     Passive exposure: Current    Smokeless tobacco: Never   Vaping Use    Vaping status: Never Used   Substance and Sexual Activity    Alcohol use: Never    Drug use: Never    Sexual activity: Defer       Review of Systems   Constitutional:  Positive for appetite change, fatigue and unexpected weight loss.   HENT:  Positive for ear pain, sneezing and tinnitus.         Dry mouth   Eyes:  Positive for visual disturbance.   Respiratory:  Positive for cough.    Endocrine: Positive for  "polyuria.   Genitourinary:  Positive for frequency and urgency.   Musculoskeletal:  Positive for arthralgias, back pain, gait problem, joint swelling and neck stiffness.   Skin:  Positive for dry skin.   Allergic/Immunologic: Positive for environmental allergies.   Neurological:  Positive for dizziness, light-headedness and numbness.   Psychiatric/Behavioral:  Positive for positive for hyperactivity, depressed mood and stress. The patient is nervous/anxious.    All other systems reviewed and are negative.      I have reviewed and updated the patient's chief complaint, history of present illness, review of systems, past medical history, surgical history, family history, social history, medications and allergy list as appropriate.     Objective    Vital Signs:   Vitals:    07/16/25 1013   BP: 128/64   Pulse: 82   Temp: 97.7 °F (36.5 °C)   Weight: 56.1 kg (123 lb 9.6 oz)   Height: 152.4 cm (60\")   PainSc: 7    PainLoc: Back  Comment: knees, hands.         Body mass index is 24.14 kg/m².  Defer to PCP      Physical Exam  Patient is alert and in no acute distress. A cane is used for mobility.  Head is atraumatic and normocephalic. Pupils are round and equal. Extraocular muscles are intact. Oropharynx is negative.   Normal respiratory effort  Heart exhibits regular rhythm and rate  Cervical spine shows moderate decrease in range of motion. Lumbar spine is tender. Shoulders are nontender with good range of motion. Right hand exhibits some first CMC squaring and thumb subluxation, same on the left hand. Wrists and PIPs are nontender. No synovitis is present. Knees show slight crepitus. No pain or swelling in the knees, ankles, and MTPs.  Skin is free of any acute rash.      Assessment / Plan      Assessment & Plan  Rheumatoid arthritis involving multiple sites with positive rheumatoid factor  Diagnosed with JRA in her teens.   Positive FELIPE of 1:160 with positive rheumatoid factor IgA and IgM. IgG was negative. CCP was " negative.   Unable to take anti-inflammatories due to stomach ulcers.   Last hand and foot films were negative for erosions but showed lateral deviation of the right second through fourth toes consistent with RA.   Plaquenil was started in April 2023 and increased back to twice a day due to increased stiffness when reduced to once daily.     Continue Plaquenil at only once daily due to current weight  Continue Osteo Bi-Flex DS for osteoarthritis and Tylenol Arthritis 2 tablets twice a day as needed  Check labs today  RTC 6 months  High risk medication use  Plaquenil well tolerated and effective  Continue annual eye exams    We have discussed the side effects of hydroxychloroquine including but not limited to GI upset, rash, photosensitivity, Hematologic and liver abnormalities and ocular abnormalities and need for frequent eye exam for toxicity monitoring  Positive FELIPE (antinuclear antibody)  No signs or symptoms of lupus.   She will use sunscreen with an SPF of 50+.  Chronic gout without tophus, unspecified cause, unspecified site  History of uric acid kidney stones and previous flares in the great toe joints.   Allopurinol was started in December 2022, and she has had no attacks since.     Continue allopurinol 100 mg daily  Check uric acid  Chronic midline low back pain without sciatica  History of degenerative disc disease with multiple lumbar surgeries, the last in 2017.   Previous injections.   She had spinal stimulator with Dr. Dixon with excellent results.     Continue gabapentin and Flexeril per outside provider  Continue Tylenol Arthritis      Discussed plan of care in detail with the patient today.  Patient verbalized understanding and agrees.    I confirm accuracy of unchanged data/findings which have been carried forward from previous visit.  I have updated appropriately those that have changed.    Follow Up:   Return in about 6 months (around 1/16/2026) for YAZMIN Paul, YAZMIN Antunez, MILTON  YAZMIN Cat.       YAZMIN Paul  [facility]

## 2025-06-27 NOTE — ASSESSMENT & PLAN NOTE
History of uric acid kidney stones and previous flares in the great toe joints.   Allopurinol was started in December 2022, and she has had no attacks since.     Continue allopurinol 100 mg daily  Check uric acid

## 2025-07-16 ENCOUNTER — OFFICE VISIT (OUTPATIENT)
Age: 65
End: 2025-07-16
Payer: MEDICARE

## 2025-07-16 VITALS
DIASTOLIC BLOOD PRESSURE: 64 MMHG | SYSTOLIC BLOOD PRESSURE: 128 MMHG | HEART RATE: 82 BPM | HEIGHT: 60 IN | TEMPERATURE: 97.7 F | WEIGHT: 123.6 LBS | BODY MASS INDEX: 24.26 KG/M2

## 2025-07-16 DIAGNOSIS — M54.50 CHRONIC MIDLINE LOW BACK PAIN WITHOUT SCIATICA: ICD-10-CM

## 2025-07-16 DIAGNOSIS — M05.79 RHEUMATOID ARTHRITIS INVOLVING MULTIPLE SITES WITH POSITIVE RHEUMATOID FACTOR: Primary | ICD-10-CM

## 2025-07-16 DIAGNOSIS — G89.29 CHRONIC MIDLINE LOW BACK PAIN WITHOUT SCIATICA: ICD-10-CM

## 2025-07-16 DIAGNOSIS — R76.8 POSITIVE ANA (ANTINUCLEAR ANTIBODY): ICD-10-CM

## 2025-07-16 DIAGNOSIS — M1A.9XX0 CHRONIC GOUT WITHOUT TOPHUS, UNSPECIFIED CAUSE, UNSPECIFIED SITE: ICD-10-CM

## 2025-07-16 DIAGNOSIS — Z79.899 HIGH RISK MEDICATION USE: ICD-10-CM

## 2025-07-16 DIAGNOSIS — Z51.81 ENCOUNTER FOR MEDICATION MONITORING: ICD-10-CM

## 2025-07-16 PROBLEM — M54.16 LUMBAR RADICULOPATHY: Status: ACTIVE | Noted: 2023-11-09

## 2025-07-16 PROBLEM — F41.9 ANXIETY: Status: ACTIVE | Noted: 2022-12-07

## 2025-07-16 PROBLEM — K21.9 GASTROESOPHAGEAL REFLUX DISEASE: Status: ACTIVE | Noted: 2022-12-07

## 2025-07-16 PROBLEM — F32.A DEPRESSION: Status: ACTIVE | Noted: 2022-12-07

## 2025-07-16 RX ORDER — HYDROXYCHLOROQUINE SULFATE 200 MG/1
200 TABLET, FILM COATED ORAL DAILY
Qty: 90 TABLET | Refills: 1 | Status: SHIPPED | OUTPATIENT
Start: 2025-07-16

## 2025-07-16 RX ORDER — METHOCARBAMOL 500 MG/1
500 TABLET, FILM COATED ORAL AS NEEDED
COMMUNITY
Start: 2025-05-12

## 2025-07-16 RX ORDER — ALLOPURINOL 100 MG/1
100 TABLET ORAL DAILY
Qty: 90 TABLET | Refills: 1 | Status: SHIPPED | OUTPATIENT
Start: 2025-07-16

## 2025-07-16 RX ORDER — ALBUTEROL SULFATE 90 UG/1
2 INHALANT RESPIRATORY (INHALATION) EVERY 6 HOURS PRN
COMMUNITY
Start: 2025-07-02 | End: 2026-07-02